# Patient Record
Sex: MALE | Race: WHITE | NOT HISPANIC OR LATINO
[De-identification: names, ages, dates, MRNs, and addresses within clinical notes are randomized per-mention and may not be internally consistent; named-entity substitution may affect disease eponyms.]

---
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
## 2023-05-16 ENCOUNTER — RX ONLY (OUTPATIENT)
Age: 18
Setting detail: RX ONLY
End: 2023-05-16

## 2023-05-16 RX ORDER — KETOCONAZOLE 20 MG/ML
SHAMPOO, SUSPENSION TOPICAL
Qty: 120 | Refills: 3 | Status: ERX

## 2023-11-28 ENCOUNTER — APPOINTMENT (RX ONLY)
Dept: URBAN - METROPOLITAN AREA CLINIC 154 | Facility: CLINIC | Age: 18
Setting detail: DERMATOLOGY
End: 2023-11-28

## 2023-11-28 DIAGNOSIS — Z41.9 ENCOUNTER FOR PROCEDURE FOR PURPOSES OTHER THAN REMEDYING HEALTH STATE, UNSPECIFIED: ICD-10-CM

## 2023-11-28 PROCEDURE — ? OTHER

## 2024-04-08 ENCOUNTER — RX ONLY (OUTPATIENT)
Age: 19
Setting detail: RX ONLY
End: 2024-04-08

## 2024-04-08 RX ORDER — ROFLUMILAST 3 MG/G
AEROSOL, FOAM TOPICAL
Qty: 60 | Refills: 3 | Status: ERX | COMMUNITY
Start: 2024-04-08

## 2024-06-25 ENCOUNTER — RX ONLY (OUTPATIENT)
Age: 19
Setting detail: RX ONLY
End: 2024-06-25

## 2024-06-25 RX ORDER — AZITHROMYCIN DIHYDRATE 250 MG/1
TABLET, FILM COATED ORAL
Qty: 1 | Refills: 0 | Status: ERX | COMMUNITY
Start: 2024-06-25

## 2024-12-27 ENCOUNTER — APPOINTMENT (OUTPATIENT)
Dept: URBAN - METROPOLITAN AREA CLINIC 154 | Facility: CLINIC | Age: 19
Setting detail: DERMATOLOGY
End: 2024-12-27

## 2024-12-27 VITALS — HEIGHT: 77 IN | WEIGHT: 270 LBS

## 2024-12-27 DIAGNOSIS — Z41.9 ENCOUNTER FOR PROCEDURE FOR PURPOSES OTHER THAN REMEDYING HEALTH STATE, UNSPECIFIED: ICD-10-CM

## 2024-12-27 PROCEDURE — ? OTHER

## 2024-12-27 NOTE — PROCEDURE: OTHER
Other (Free Text): Mariusz wants to restart Semaglutide. He understands he will need to be seen or call in before he runs out. He will call or tell his mother if he needs zofran.   Patty Sawyer MA
Detail Level: Zone
Render Risk Assessment In Note?: no
Note Text (......Xxx Chief Complaint.): This diagnosis correlates with the

## 2025-01-20 ENCOUNTER — NEW PATIENT (OUTPATIENT)
Age: 20
End: 2025-01-20

## 2025-01-20 DIAGNOSIS — H00.15: ICD-10-CM

## 2025-01-20 PROCEDURE — 99203 OFFICE O/P NEW LOW 30 MIN: CPT | Mod: 25

## 2025-01-20 PROCEDURE — 67800 REMOVE EYELID LESION: CPT

## 2025-01-20 RX ORDER — CEPHALEXIN 500 MG/1: 1 CAPSULE ORAL TWICE A DAY

## 2025-01-20 RX ORDER — TOBRAMYCIN AND DEXAMETHASONE 1; 3 MG/ML; MG/ML
1 SUSPENSION/ DROPS OPHTHALMIC
Start: 2025-01-20

## 2025-05-21 ENCOUNTER — APPOINTMENT (OUTPATIENT)
Dept: URBAN - METROPOLITAN AREA CLINIC 154 | Facility: CLINIC | Age: 20
Setting detail: DERMATOLOGY
End: 2025-05-21

## 2025-05-21 VITALS — HEIGHT: 77 IN | WEIGHT: 298 LBS

## 2025-05-21 DIAGNOSIS — Z41.9 ENCOUNTER FOR PROCEDURE FOR PURPOSES OTHER THAN REMEDYING HEALTH STATE, UNSPECIFIED: ICD-10-CM

## 2025-05-21 PROCEDURE — ? OTHER
